# Patient Record
Sex: MALE | Race: WHITE | NOT HISPANIC OR LATINO | Employment: STUDENT
[De-identification: names, ages, dates, MRNs, and addresses within clinical notes are randomized per-mention and may not be internally consistent; named-entity substitution may affect disease eponyms.]

---

## 2017-07-01 ENCOUNTER — HEALTH MAINTENANCE LETTER (OUTPATIENT)
Age: 22
End: 2017-07-01

## 2019-11-28 ENCOUNTER — APPOINTMENT (OUTPATIENT)
Dept: GENERAL RADIOLOGY | Facility: CLINIC | Age: 24
End: 2019-11-28
Attending: PHYSICIAN ASSISTANT
Payer: COMMERCIAL

## 2019-11-28 ENCOUNTER — HOSPITAL ENCOUNTER (EMERGENCY)
Facility: CLINIC | Age: 24
Discharge: HOME | End: 2019-11-28
Admitting: PHYSICIAN ASSISTANT
Payer: COMMERCIAL

## 2019-11-28 VITALS
SYSTOLIC BLOOD PRESSURE: 141 MMHG | OXYGEN SATURATION: 94 % | TEMPERATURE: 98 F | RESPIRATION RATE: 20 BRPM | DIASTOLIC BLOOD PRESSURE: 81 MMHG | HEART RATE: 69 BPM | WEIGHT: 162.48 LBS

## 2019-11-28 DIAGNOSIS — S62.002A CLOSED NONDISPLACED FRACTURE OF SCAPHOID OF LEFT WRIST, UNSPECIFIED PORTION OF SCAPHOID, INITIAL ENCOUNTER: ICD-10-CM

## 2019-11-28 PROCEDURE — 73110 X-RAY EXAM OF WRIST: CPT | Mod: LT

## 2019-11-28 PROCEDURE — 29125 APPL SHORT ARM SPLINT STATIC: CPT | Mod: LT

## 2019-11-28 PROCEDURE — 99285 EMERGENCY DEPT VISIT HI MDM: CPT

## 2019-11-28 ASSESSMENT — ENCOUNTER SYMPTOMS: ARTHRALGIAS: 1

## 2019-11-28 NOTE — ED PROVIDER NOTES
History     Chief Complaint:  Wrist Pain    HPI   Mike Molian is a 24 year old male who presents for evaluation of wrist pain. Yesterday night around 2400 the patient slipped on a patch of ice falling onto his outstretched left hand. Since then, the patient has developed pain in the left wrist that is worse with any movement. Due to his persistent pain today, the patient came into the ED for evaluation. Currently in the ED, the patient rates his pain at a severity of 1/10 and he did take three tablets of Ibuprofen before arrival. He denies any other injuries from the fall.     Allergies:  NKDA      Medications:    Albuterol inhaler   Albuterol nebulizer  Claritin  Nasonex      Past Medical History:    Asthma     Past Surgical History:    History reviewed. No pertinent past surgical history.     Family History:    Asthma - Father     Social History:  Tobacco use:    Never smoker   Alcohol use:    Negative   Drug use:    Negative   Marital status:    Single   Accompanied to ED by:  Alone      Review of Systems   Musculoskeletal: Positive for arthralgias (left wrist).   All other systems reviewed and are negative.      Physical Exam   First Vitals:  BP: (!) 141/81  Pulse: 69  Temp: 98  F (36.7  C)  Resp: 20  Weight: 73.7 kg (162 lb 7.7 oz)  SpO2: 94 %    Physical Exam  General: Alert and oriented.    Head: Normocephalic.  External ears and nose normal.    Eyes: Pupils equal and round.  Normal tracking.    Pulmonary/Chest: Effort and rate normal    MSK:  Normal movement through the elbow, wrist, and fingers without tendonous deficit.   No gross deformity or effusion. No tenderness to palpation over distal radius or ulna. There is tenderness to palpation over anatomic snuffbox and pain with axial loading of the thumb.  SKIN:  Warm and dry with strong radial pulse and normal capillary refill.    NEURO:  5/5 strength through the fingers/wrist/elbow.  Normal strength of flexion and extension at MCP, PIP, and DIP  joints.  Normal sensation through the radial/ulnar/median nerve distributions.    PSYCH:  Normal affect      Emergency Department Course     Imaging:  Radiographic findings were communicated with the patient who voiced understanding of the findings.    XR Wrist Left:  IMPRESSION:. Subtle lucency projected over the scaphoid waist on the PA and oblique views could represent a very subtle not significantly displaced fracture. Scaphoid views may be helpful if this patient has history of snuffbox tenderness. No other fracture or malalignment.  Per radiology.     Procedures:    Splint Placement     An orthoglass thumb spica splint was applied and after placement I checked and adjusted the fit to ensure proper positioning. Patient was more comfortable with splint in place. Sensation and circulation are intact after splint placement.    Emergency Department Course:  Nursing notes and vitals reviewed.  1405: I performed an exam of the patient as documented above.     1451: I updated and reassessed the patient.      1507:  A splint placement was performed as outlined in the procedure note above.  The patient tolerated well and there were no complications.     Findings and plan explained to the Patient. Patient discharged home with instructions regarding supportive care, medications, and reasons to return. The importance of close follow-up was reviewed.     Impression & Plan      Medical Decision Makin-year-old male presents with left wrist pain after FOOSH.  Patient history and records reviewed.  X-rays demonstrate probable scaphoid fracture, and this is consistent with the patient's examination.  Neurovascular status is intact.  Examination of the joints above and below are not suggestive of fracture.  Ortho-Glass splint was applied, and the patient will follow-up with [facility].  No open fracture or indication for orthopedic consultation from the ED.  Discussed return precautions and symptomatic cares for home  and these were provided in writing.  He will return to the ED if any new or worsening symptoms develop such as numbness, color change, significant pain increase etc.    Diagnosis:    ICD-10-CM   1. Closed nondisplaced fracture of scaphoid of left wrist, unspecified portion of scaphoid, initial encounter S62.002A     Disposition:  Discharged to home    Artemio WHELAN, am serving as a scribe at 2:05 PM on 11/28/2019 to document services personally performed by Dennis Ballard PA-C, based on my observations and the provider's statements to me.    [facility] EMERGENCY DEPARTMENT       Dennis Ballard PA-C  11/28/19 2329

## 2019-11-28 NOTE — ED AVS SNAPSHOT
[facility] Emergency Department  [address]  [facility] [address]  Phone:  [phone]  Fax:  [phone]                                    Mike Molina   MRN: [id number]    Department:  [facility] Emergency Department   Date of Visit:  11/28/2019           After Visit Summary Signature Page    I have received my discharge instructions, and my questions have been answered. I have discussed any challenges I see with this plan with the nurse or doctor.    ..........................................................................................................................................  Patient/Patient Representative Signature      ..........................................................................................................................................  Patient Representative Print Name and Relationship to Patient    ..................................................               ................................................  Date                                   Time    ..........................................................................................................................................  Reviewed by Signature/Title    ...................................................              ..............................................  Date                                               Time          22EPIC Rev 08/18

## 2019-11-28 NOTE — DISCHARGE INSTRUCTIONS
Discharge Instructions  Splint Care    You had a splint put on today to help protect your injury and help it heal.  Splints are used to treat things like strains, sprains, large cuts, and fractures (broken bones). Splints are temporary and are designed to allow for swelling.    Be sure your splint is not too tight!  If you splint is too tight, it may cause loss of blood supply.  Signs of your splint being too tight include: your arm or leg hurting a lot more; your fingers or toes getting numb, cold, pale or blue; or your child is crying, fussing or seeming restless.    Generally, every Emergency Department visit should have a follow-up clinic visit with either a primary or a specialty clinic/provider. Please follow-up as instructed by your emergency provider today.  Return to the Emergency Department right away if:  You have increased pain or pressure around the injury.  You have numbness, tingling, or cool, pale, or blue toes or fingers past the injury.  Your child is more fussy than normal, crying a lot, or restless.  Your splint becomes soft, breaks, or is wet.  Your splint begins to smell bad.  Your splint is cutting into your skin.    Home care:  Keep the injured area above the level of your heart while laying or sitting down.  This will help decrease the swelling and the pain.  Keep the splint dry.  Do not put objects down or inside the splint.  If there is an elastic bandage (Ace  wrap) holding the splint on this may be loosened slightly to relieve pressure or pain.  If pain continues return to the Emergency Department right away.  Do not remove your splint by yourself unless told to by your provider.    Follow-up:  Sometimes the splint put on in the Emergency Department needs to be changed once the swelling has gone down and a more permanent cast needs to be placed.  This is usually done by a bone specialist provider (Orthopedist).  Follow the instructions given to you by your provider today.    If you were  given a prescription for medicine here today, be sure to read all of the information (including the package insert) that comes with your prescription.  This will include important information about the medicine, its side effects, and any warnings that you need to know about.  The pharmacist who fills the prescription can provide more information and answer questions you may have about the medicine.  If you have questions or concerns that the pharmacist cannot address, please call or return to the Emergency Department.     Remember that you can always come back to the Emergency Department if you are not able to see your regular provider in the amount of time listed above, if you get any new symptoms, or if there is anything that worries you.  Discharge Instructions  Extremity Injury    You were seen today for an injury to an extremity (arm, hand, leg, or foot). You may have a bruise, strain, or fracture (broken bone).    Generally, every Emergency Department visit should have a follow-up clinic visit with either a primary or a specialty clinic/provider. Please follow-up as instructed by your emergency provider today.  Return to the Emergency Department right away if:  Your pain seems to change or get worse or there is pain in a new area that wasn t evaluated today.  Your extremity becomes pale, cool, blue, or numb or tingling past the injury.  You have more drainage, redness or pain in the area of the cut or abrasion.  You have pain that you cannot control with the medicine recommended or prescribed here, or you have pain that seems too much for your injury.  Your child (who is injured) will not stop crying or is much more fussy than normal.  You have new symptoms or anything that worries you.    What to Expect:  Your swelling and pain may be worse the day after your injury, but should not be severe and should start getting better after that. You should not have new symptoms and your pain should not get worse.  You  may start to get a bruise over the injured area or below the injured area (bruising can follow gravity).  Your movement and strength should get better with time.  Some injuries may not show up until after you have left the Emergency Department so it is important to follow-up as directed.  Your injury may prevent you from working.  Follow-up with your regular provider to get a work release note.  Pain medications or your injury may make it unsafe to drive or operate machinery.    Home Care:  RICE: Rest, Ice, Compression, Elevation  Rest: Rest your injured area for at least 1-2 days. After that you may start using your extremity again as long as there is not too much pain.   Ice: Apply ice your injured area for 15 minutes at a time, at least 3 times a day. Use a cloth between the ice bag and your skin to prevent frostbite. Do not sleep with an ice pack or heating pad on, since this can cause burns or skin injury.  Compression: You may use an elastic bandage (Ace  Wrap) if it makes you more comfortable. Wrap it just tight enough to provide light compression, like a new pair of socks feels. Loosen the bandage if you have swelling past the bandage.  Elevation: Raise the injured area above the level of your heart as much as possible in the first 1-2 days.    Use Tylenol  (acetaminophen), Motrin (ibuprofen), or Advil  (ibuprofen) for your pain unless you have an allergy or are told not to use these medications by your provider.  Take the medications as instructed on the package. Tylenol  (acetaminophen) is in many prescription medicines and non-prescription medicines--check all of your medicines to be sure you aren t taking more than 3000 mg per day.  Please follow any other instructions that were discussed with you by your provider.    Stretching/Exercises:  You may have been provided with instructions for stretching or exercises. If your injury was to your arm or shoulder and your provider put you in a sling or an  immobilizer, it is important that you take off your immobilizer within 3 days and stretch/move your shoulder, unless your provider specifically tells you to not move your shoulder.  This is to prevent further injury such as a  frozen shoulder .     If you were given a prescription for medicine here today, be sure to read all of the information (including the package insert) that comes with your prescription.  This will include important information about the medicine, its side effects, and any warnings that you need to know about.  The pharmacist who fills the prescription can provide more information and answer questions you may have about the medicine.  If you have questions or concerns that the pharmacist cannot address, please call or return to the Emergency Department.     Remember that you can always come back to the Emergency Department if you are not able to see your regular provider in the amount of time listed above, if you get any new symptoms, or if there is anything that worries you.

## 2019-11-28 NOTE — ED TRIAGE NOTES
Patient states he slipped and fell on the ice last night.  Complaining of left wrist pain.     ABCs intact.  Alert and oriented x 3.

## 2020-10-15 ENCOUNTER — OFFICE VISIT (OUTPATIENT)
Dept: FAMILY MEDICINE | Facility: CLINIC | Age: 25
End: 2020-10-15
Payer: COMMERCIAL

## 2020-10-15 VITALS
BODY MASS INDEX: 25.01 KG/M2 | WEIGHT: 165 LBS | SYSTOLIC BLOOD PRESSURE: 112 MMHG | OXYGEN SATURATION: 96 % | TEMPERATURE: 97.7 F | DIASTOLIC BLOOD PRESSURE: 60 MMHG | HEART RATE: 63 BPM | HEIGHT: 68 IN

## 2020-10-15 DIAGNOSIS — Z23 NEED FOR VACCINATION: ICD-10-CM

## 2020-10-15 DIAGNOSIS — Z00.00 ENCOUNTER FOR ROUTINE ADULT HEALTH EXAMINATION WITHOUT ABNORMAL FINDINGS: Primary | ICD-10-CM

## 2020-10-15 DIAGNOSIS — Z23 NEED FOR PROPHYLACTIC VACCINATION AND INOCULATION AGAINST INFLUENZA: ICD-10-CM

## 2020-10-15 PROCEDURE — 90472 IMMUNIZATION ADMIN EACH ADD: CPT | Performed by: NURSE PRACTITIONER

## 2020-10-15 PROCEDURE — 90651 9VHPV VACCINE 2/3 DOSE IM: CPT | Performed by: NURSE PRACTITIONER

## 2020-10-15 PROCEDURE — 90686 IIV4 VACC NO PRSV 0.5 ML IM: CPT | Performed by: NURSE PRACTITIONER

## 2020-10-15 PROCEDURE — 90471 IMMUNIZATION ADMIN: CPT | Performed by: NURSE PRACTITIONER

## 2020-10-15 PROCEDURE — 99395 PREV VISIT EST AGE 18-39: CPT | Mod: 25 | Performed by: NURSE PRACTITIONER

## 2020-10-15 PROCEDURE — 90715 TDAP VACCINE 7 YRS/> IM: CPT | Performed by: NURSE PRACTITIONER

## 2020-10-15 ASSESSMENT — MIFFLIN-ST. JEOR: SCORE: 1710.32

## 2020-10-15 NOTE — PROGRESS NOTES
SUBJECTIVE:   CC: Mike Molina is an 25 year old male who presents for preventative health visit.     Patient has been advised of split billing requirements and indicates understanding: Yes  Healthy Habits:    Getting at least 3 servings of Calcium per day:  Yes    Bi-annual eye exam:  NO    Dental care twice a year:  NO    Sleep apnea or symptoms of sleep apnea:  None    Diet:  Regular (no restrictions)    Frequency of exercise:  None    Duration of exercise:  N/A    Taking medications regularly:  Not Applicable    Barriers to taking medications:  Not applicable    Medication side effects:  Not applicable    PHQ-2 Total Score:    Additional concerns today:  Yes      Today's PHQ-2 Score:   PHQ-2 ( 1999 Pfizer) 10/15/2020   Q1: Little interest or pleasure in doing things 0   Q2: Feeling down, depressed or hopeless 0   PHQ-2 Score 0       Abuse: Current or Past(Physical, Sexual or Emotional)- No  Do you feel safe in your environment? Yes      Social History     Tobacco Use     Smoking status: Never Smoker     Smokeless tobacco: Never Used   Substance Use Topics     Alcohol use: No     If you drink alcohol do you typically have >3 drinks per day or >7 drinks per week? No    No flowsheet data found.    Last PSA: No results found for: PSA    Reviewed orders with patient. Reviewed health maintenance and updated orders accordingly - Yes  Lab work is in process    Reviewed and updated as needed this visit by clinical staff  Tobacco  Allergies  Meds  Problems  Med Hx  Surg Hx  Fam Hx          Reviewed and updated as needed this visit by Provider  Tobacco  Allergies  Meds  Problems  Med Hx  Surg Hx  Fam Hx             Review of Systems  CONSTITUTIONAL: NEGATIVE for fever, chills, change in weight  INTEGUMENTARY/SKIN: NEGATIVE for worrisome rashes, moles or lesions  EYES: NEGATIVE for vision changes or irritation  ENT: NEGATIVE for ear, mouth and throat problems  RESP: NEGATIVE for significant cough or  "SOB  CV: NEGATIVE for chest pain, palpitations or peripheral edema  GI: NEGATIVE for nausea, abdominal pain, heartburn, or change in bowel habits   male: negative for dysuria, hematuria, decreased urinary stream, erectile dysfunction, urethral discharge  MUSCULOSKELETAL: NEGATIVE for significant arthralgias or myalgia  NEURO: NEGATIVE for weakness, dizziness or paresthesias  PSYCHIATRIC: NEGATIVE for changes in mood or affect    OBJECTIVE:   /60   Pulse 63   Temp 97.7  F (36.5  C) (Tympanic)   Ht 1.715 m (5' 7.52\")   Wt 74.8 kg (165 lb)   SpO2 96%   BMI 25.45 kg/m      Physical Exam  GENERAL: healthy, alert and no distress  EYES: Eyes grossly normal to inspection, PERRL and conjunctivae and sclerae normal  HENT: ear canals and TM's normal, nose and mouth without ulcers or lesions  NECK: no adenopathy, no asymmetry, masses, or scars and thyroid normal to palpation  RESP: lungs clear to auscultation - no rales, rhonchi or wheezes  CV: regular rate and rhythm, normal S1 S2, no S3 or S4, no murmur, click or rub, no peripheral edema and peripheral pulses strong  ABDOMEN: soft, nontender, no hepatosplenomegaly, no masses and bowel sounds normal  MS: no gross musculoskeletal defects noted, no edema  SKIN: no suspicious lesions or rashes  NEURO: Normal strength and tone, mentation intact and speech normal  PSYCH: mentation appears normal, affect normal/bright    Diagnostic Test Results:  Labs reviewed in Epic    ASSESSMENT/PLAN:   Mike was seen today for physical and imm/inj.    Diagnoses and all orders for this visit:    Encounter for routine adult health examination without abnormal findings    Need for vaccination  -     HUMAN PAPILLOMA VIRUS (GARDASIL 9) VACCINE [29940]  -     TDAP VACCINE (Adacel, Boostrix)  [[id number]]  -     Each additional admin.  (Right click and add QUANTITY)  [[id number]]    Need for prophylactic vaccination and inoculation against influenza  -     INFLUENZA VACCINE IM > 6 MONTHS VALENT " "IIV4 [17352]  -     Vaccine Administration, Initial [44201]        Patient has been advised of split billing requirements and indicates understanding: Yes  COUNSELING:   Reviewed preventive health counseling, as reflected in patient instructions    Estimated body mass index is 25.45 kg/m  as calculated from the following:    Height as of this encounter: 1.715 m (5' 7.52\").    Weight as of this encounter: 74.8 kg (165 lb).         He reports that he has never smoked. He has never used smokeless tobacco.      Counseling Resources:  ATP IV Guidelines  Pooled Cohorts Equation Calculator  FRAX Risk Assessment  ICSI Preventive Guidelines  Dietary Guidelines for Americans, 2010  USDA's MyPlate  ASA Prophylaxis  Lung CA Screening    Aj Dias NP  [facility]  "

## 2020-10-16 ASSESSMENT — ASTHMA QUESTIONNAIRES: ACT_TOTALSCORE: 25
